# Patient Record
Sex: FEMALE
[De-identification: names, ages, dates, MRNs, and addresses within clinical notes are randomized per-mention and may not be internally consistent; named-entity substitution may affect disease eponyms.]

---

## 2020-04-07 ENCOUNTER — NURSE TRIAGE (OUTPATIENT)
Dept: OTHER | Facility: CLINIC | Age: 25
End: 2020-04-07

## 2020-04-07 NOTE — TELEPHONE ENCOUNTER
Pt has abdominal pain, and rib pain. States she thinks she is losing weight, has had symptoms for approximately 2 weeks. Denies fever, vomiting, diarrhea. States the pain is constant, sometimes  Eating makes it worse. Pt is not constipated, states she is having bowel movements regularly. Given disposition recommendation.      Reason for Disposition   Abdominal pain is a chronic symptom (recurrent or ongoing AND lasting > 4 weeks)    Protocols used: ABDOMINAL PAIN - [facility] - AIRAM SIMMONS